# Patient Record
Sex: MALE | Race: WHITE | NOT HISPANIC OR LATINO | Employment: OTHER | URBAN - METROPOLITAN AREA
[De-identification: names, ages, dates, MRNs, and addresses within clinical notes are randomized per-mention and may not be internally consistent; named-entity substitution may affect disease eponyms.]

---

## 2017-11-21 DIAGNOSIS — I25.10 CORONARY ARTERY DISEASE, ANGINA PRESENCE UNSPECIFIED, UNSPECIFIED VESSEL OR LESION TYPE, UNSPECIFIED WHETHER NATIVE OR TRANSPLANTED HEART: Primary | ICD-10-CM

## 2018-01-03 ENCOUNTER — DOCUMENTATION ONLY (OUTPATIENT)
Dept: CARDIOLOGY | Facility: CLINIC | Age: 63
End: 2018-01-03

## 2018-01-03 ENCOUNTER — OFFICE VISIT (OUTPATIENT)
Dept: CARDIOLOGY | Facility: CLINIC | Age: 63
End: 2018-01-03
Payer: MEDICARE

## 2018-01-03 ENCOUNTER — INITIAL CONSULT (OUTPATIENT)
Dept: RADIATION ONCOLOGY | Facility: CLINIC | Age: 63
End: 2018-01-03
Payer: MEDICARE

## 2018-01-03 VITALS
WEIGHT: 192.69 LBS | OXYGEN SATURATION: 100 % | HEIGHT: 69 IN | HEART RATE: 57 BPM | SYSTOLIC BLOOD PRESSURE: 129 MMHG | DIASTOLIC BLOOD PRESSURE: 81 MMHG | BODY MASS INDEX: 28.54 KG/M2

## 2018-01-03 VITALS
DIASTOLIC BLOOD PRESSURE: 81 MMHG | SYSTOLIC BLOOD PRESSURE: 129 MMHG | WEIGHT: 192 LBS | TEMPERATURE: 98 F | BODY MASS INDEX: 28.35 KG/M2 | RESPIRATION RATE: 20 BRPM | HEART RATE: 57 BPM

## 2018-01-03 DIAGNOSIS — I25.10 CORONARY ARTERY DISEASE, ANGINA PRESENCE UNSPECIFIED, UNSPECIFIED VESSEL OR LESION TYPE, UNSPECIFIED WHETHER NATIVE OR TRANSPLANTED HEART: Primary | ICD-10-CM

## 2018-01-03 DIAGNOSIS — I25.10 CAD, MULTIPLE VESSEL: Primary | ICD-10-CM

## 2018-01-03 PROCEDURE — 99999 PR PBB SHADOW E&M-EST. PATIENT-LVL IV: CPT | Mod: PBBFAC,,, | Performed by: INTERNAL MEDICINE

## 2018-01-03 PROCEDURE — 99213 OFFICE O/P EST LOW 20 MIN: CPT | Mod: PBBFAC,27 | Performed by: RADIOLOGY

## 2018-01-03 PROCEDURE — 99999 PR PBB SHADOW E&M-EST. PATIENT-LVL III: CPT | Mod: PBBFAC,,, | Performed by: RADIOLOGY

## 2018-01-03 PROCEDURE — 99204 OFFICE O/P NEW MOD 45 MIN: CPT | Mod: S$PBB,,, | Performed by: INTERNAL MEDICINE

## 2018-01-03 PROCEDURE — 99214 OFFICE O/P EST MOD 30 MIN: CPT | Mod: PBBFAC | Performed by: INTERNAL MEDICINE

## 2018-01-03 PROCEDURE — 99201 PR OFFICE/OUTPT VISIT,NEW,LEVL I: CPT | Mod: S$PBB,,, | Performed by: RADIOLOGY

## 2018-01-03 RX ORDER — DIPHENHYDRAMINE HCL 25 MG
50 CAPSULE ORAL ONCE
Status: CANCELLED | OUTPATIENT
Start: 2018-01-03 | End: 2018-01-03

## 2018-01-03 RX ORDER — CARVEDILOL 12.5 MG/1
3.12 TABLET ORAL 2 TIMES DAILY WITH MEALS
COMMUNITY

## 2018-01-03 RX ORDER — ATORVASTATIN CALCIUM 40 MG/1
40 TABLET, FILM COATED ORAL DAILY
COMMUNITY

## 2018-01-03 RX ORDER — NITROGLYCERIN 0.4 MG/1
0.4 TABLET SUBLINGUAL EVERY 5 MIN PRN
COMMUNITY

## 2018-01-03 RX ORDER — ASPIRIN 81 MG/1
81 TABLET ORAL DAILY
COMMUNITY

## 2018-01-03 RX ORDER — ISOSORBIDE MONONITRATE 60 MG/1
60 TABLET, EXTENDED RELEASE ORAL EVERY MORNING
COMMUNITY

## 2018-01-03 RX ORDER — ASPIRIN 325 MG
325 TABLET ORAL ONCE
Status: CANCELLED | OUTPATIENT
Start: 2018-01-03 | End: 2018-01-03

## 2018-01-03 RX ORDER — SODIUM CHLORIDE 9 MG/ML
3 INJECTION, SOLUTION INTRAVENOUS CONTINUOUS
Status: CANCELLED | OUTPATIENT
Start: 2018-01-03 | End: 2018-01-03

## 2018-01-03 RX ORDER — DICLOFENAC SODIUM 75 MG/1
75 TABLET, DELAYED RELEASE ORAL 2 TIMES DAILY
Status: ON HOLD | COMMUNITY
End: 2018-01-04 | Stop reason: HOSPADM

## 2018-01-03 RX ORDER — HYDROCODONE BITARTRATE AND ACETAMINOPHEN 10; 325 MG/1; MG/1
TABLET ORAL
COMMUNITY

## 2018-01-03 RX ORDER — ALPRAZOLAM 1 MG/1
TABLET, EXTENDED RELEASE ORAL DAILY
COMMUNITY

## 2018-01-03 RX ORDER — RANOLAZINE 500 MG/1
500 TABLET, EXTENDED RELEASE ORAL 2 TIMES DAILY
COMMUNITY

## 2018-01-03 NOTE — LETTER
January 3, 2018      Justin Alberts MD  [address]  [facility] [address]           [facility]  [address]  [facility] [address]  Phone: [phone]          Patient: Asher Dyer   MR Number: [id number]   YOB: 1955   Date of Visit: 1/3/2018       Dear Dr. Justin Alberts:    Thank you for referring Asher Dyer to me for evaluation. Attached you will find relevant portions of my assessment and plan of care.    If you have questions, please do not hesitate to call me. I look forward to following Asher Dyer along with you.    Sincerely,    Porfirio Delgado MD    Enclosure  CC:  No Recipients    If you would like to receive this communication electronically, please contact [email or web address] or [phone] to request more information on [employer] Link access.    For providers and/or their staff who would like to refer a patient to [facility], please contact us through our one-stop-shop provider referral line, [facility], at [phone].    If you feel you have received this communication in error or would no longer like to receive these types of communications, please e-mail [email or web address]

## 2018-01-03 NOTE — LETTER
January 3, 2018      Jose Martin Anne MD  [address]  [place] [address]           [facility]  [address]  [facility] [address]  Phone: [phone]          Patient: Asher Dyer   MR Number: [id number]   YOB: 1955   Date of Visit: 1/3/2018       Dear Dr. Jose Martin Anne:    Thank you for referring Asher Dyer to me for evaluation. Attached you will find relevant portions of my assessment and plan of care.    If you have questions, please do not hesitate to call me. I look forward to following Asher Dyer along with you.    Sincerely,    Justin Alberts MD    Enclosure  CC:  No Recipients    If you would like to receive this communication electronically, please contact [email or web address] or [phone] to request more information on [employer] Link access.    For providers and/or their staff who would like to refer a patient to [facility], please contact us through our one-stop-shop provider referral line, [facility], at [phone].    If you feel you have received this communication in error or would no longer like to receive these types of communications, please e-mail [email or web address]

## 2018-01-03 NOTE — PROGRESS NOTES
"Subjective:    Patient ID:  Asher Dyer is a 62 y.o. male who presents for evaluation of Carotid Artery Disease      HPI  This is 61 y/o M w/ a PMHx sig for complex CAD s/p 4V CABG 7/4/2001 w/ a patent SVG-Diag, patent SVG-RCA, occluded SVG-OMB and patent LIMA-LAD per last [facility] 11/2017.    Multiple PCIs and PTCAs since CABG as follows:    2 stents to SVG-RCA - 12/11/13  1 BMS to SVG-OMB - 8/2009  1 LENO to SVG-RCA - 2/25/16  1 LENO to prox SVG-RCA - 12/8/16  1 LENO 3.75 mm , PTCA 4.0 mm NC balloon to SVG-RCA - 4/22/17  PCI 3.5 x 15 mm angiosculpt balloon to SVG-RCA - 6/18/17  PTCA (balloon angioplasty alone) to SVG-RCA - 8/8/17  PTCA to SVG-RCA - 11/17    Also has ICM 35%, HTN, HLD, Fhx of CAD    Has recurrent anginal sxs 6 weeks to 3 months after each ISR intervention. Referred to us for Brachytherapy for recurrent ISR of SVG-RCA. Denies orthopnea, paroxysmal nocturnal dyspnea or leg edema. Denies palpitations,. Has pre-syncope at times w/ walking. Denies any other significant cardiovascular or cardiopulmonary symptoms.      ROS   Constitution: negative for - fever, chills, weight loss or weight gain  HENT: negative for - sore throat, rhinorrhea, or headache  Eyes: negative for - blurred or double vision  Cardiovascular: see above  Pulmonary: see above  Gastrointestinal: negative for - abdominal pain, nausea, vomiting, or diarrhea  : negative for - dysuria  Neurological: negative for - focal weakness or sensory changes       Objective:    Physical Exam   /81 (BP Location: Right arm, Patient Position: Sitting, BP Method: Large (Automatic))   Pulse (!) 57   Ht 5' 9" (1.753 m)   Wt 87.4 kg (192 lb 10.9 oz)   SpO2 100%   BMI 28.45 kg/m²      Constitutional: No apparent distress, conversant  HEENT: Sclera anicteric, extraocular movements intact  Neck: No jugular venous distension, no carotid bruits  Cardiac: Regular rate and rhythm, no JVD,  no murmurs rubs or gallops, normal S1/S2, no LE edema  Pulm: " Clear to auscultation bilaterally, no wheezes, rales, or ronchi  GI: Abdomen soft, nontender, nondistended  Skin: No ecchymosis, erythema, or ulcers  Psych: Alert and oriented to person place location, appropriate affect  Neuro: No focal deficits  Vascular:   RIGHT LEFT   RADIAL 2+ 2+   ULNAR 2+ 2+   BRACHIAL 2+ 2+   FEMORAL 2+ 2+   DP 2+ 2+   TP 2+ 2+   JAYRO'S TEST Normal Normal   BARBEAU TEST             Assessment:       1. Coronary artery disease, angina presence unspecified, unspecified vessel or lesion type, unspecified whether native or transplanted heart    2. CAD (coronary artery disease)         Plan:       - Planned PCI to SVG-RCA w/ NC 4 mm PTCA followed by 0.9 Laser therapy and Brachytherapy  - Anti-platelet Therapy:  mg load x1, Ticagrelor 90 mg BID  - Access: R CFA 8F  - Catheters: Jr4 Guide  - Serum Creatinine/CrCl: pending  - Allergies: Morphine  - Pre-Hydration: 3 cc/kg/hr IV, continuous, for 1 hour, Pre-Procedure  - Pre-Op Med: Diphenhydramine (benadryl) 50 mg, Oral, Once, Pre-Procedure   - Pt is a LENO candidate and understands the importance of taking Ticagrelor 90 mg BID for at least one year, understands that in case of receiving a drug coated stent the failure to comply with dual anti-platelet therapy is likely to result in stent clotting, heart attack and death.   - The patient understands the risks and benefits and wishes to go ahead with the procedure.  - All patient's questions were answered.  - The risks, benefits & alternatives of the procedure were explained to the patient.   - The risks of coronary angiography include but are not limited to: Bleeding, infection, heart rhythm abnormalities, allergic reactions, kidney injury requiring dilaysis, limb loss, stroke and death.   - Should stenting be indicated, the patient has agreed to dual anti-platelet therapy for 1-consecutive year with a drug-eluting stent and a minimum of 1-month with the use of a bare metal stent.   -  Additionally, pt is aware that non-compliance is likely to result in stent clotting with heart attack, heart failure, and/or death.  - The risks of moderate sedation include hypotension, respiratory depression, arrhythmias, bronchospasm, & death.   - Informed consent was obtained & the patient is agreeable to proceed with the procedure.  - This patient was discussed with the attending interventional cardiologist who agrees with the above assessment & plan.     Above plan d/w Dr. Alberts who is in agreement.    Sorin Avalos M.D.  Interventional Cardiology Fellow  Pager: [phone]

## 2018-01-03 NOTE — PROGRESS NOTES
REFERRING PHYSICIAN: Justin Alberst M.D.    PROBLEM: Mr. Dyer is a 62 years old man with a history of coronary artery disease treated with percutaneous angioplasty and stenting now with restenosis in a previously placed stent in a coronary artery.  He is felt to be a candidate for endovascular irradiation at the time of a planned angioplasty procedure in order to prevent further restenosis.    Expected benefits and risks of endovascular irradiation at the time of coronary artery angioplasty were discussed with patient and his wife.  They appear to understand and signed the consent.    PLAN: The coronary angioplasty procedure is planned for 1/4/18.

## 2018-01-03 NOTE — PROGRESS NOTES
OUTPATIENT CATHETERIZATION INSTRUCTIONS    You have been scheduled for a procedure in the catheterization lab on Thursday, January 4, 2018.     Please report to the Cardiology Waiting Area on the Third floor of the hospital and check in at 7 AM.   You will then be taken to the SSCU (Short Stay Cardiac Unit) and prepared for your procedure. Please be aware that this is not the time of your procedure but the time you are to arrive. The procedures are scheduled on an hourly basis; however, emergency cases take precedence over all other cases.       You may not have anything to eat or drink after midnight the night before your test. You may take your regular morning medications with water. If there are any medications that you should not take you will be instructed to hold them that morning. If you are diabetic and on Metformin (Glucophage) do not take it the day before, the day of, and for 2 days after your procedure.      The procedure will take 1-2 hours to perform. After the procedure, you will return to SSCU on the third floor of the hospital. You will need to lie still (or keep your arm still) for the next 4 to 6 hours to minimize bleeding from the puncture site. Your family may remain in the room with you during this time.       You may be able to be discharged home that same afternoon if there is someone to drive you home and there were no complications. If you have one of the balloon, stent, or device procedures you may spend the night in the hospital. Your doctor will determine, based on your progress, the date and time of your discharge. The results of your procedure will be discussed with you before you are discharged. Any further testing or procedures will be scheduled for you either before you leave or you will be called with these appointments.       If you should have any questions, concerns, or need to change the date of your procedure, please call  VICKIE Edge @ [phone]    Special  Instructions:  Drink plenty of water today and after t he procedure        THE ABOVE INSTRUCTIONS WERE GIVEN TO THE PATIENT VERBALLY AND THEY VERBALIZED UNDERSTANDING.  THEY DO NOT REQUIRE ANY SPECIAL NEEDS AND DO NOT HAVE ANY LEARNING BARRIERS.          Directions for Reporting to Cardiology Waiting Area in the Hospital  If you park in the Parking Garage:  Take elevators to the1st floor of the parking garage.  Continue past the gift shop, coffee shop, and piano.  Take a right and go to the gold elevators. (Elevator B)  Take the elevator to the 3rd floor.  Follow the arrow on the sign on the wall that says Cath Lab Registration/EP Lab Registration.  Follow the long hallway all the way around until you come to a big open area.  This is the registration area.  Check in at Reception Desk.    OR    If family is dropping you off:  Have them drop you off at the front of the Hospital under the green overhang.  Enter through the doors and take a right.  Take the E elevators to the 3rd floor Cardiology Waiting Area.  Check in at the Reception Desk in the waiting room.

## 2018-01-04 ENCOUNTER — HOSPITAL ENCOUNTER (OUTPATIENT)
Facility: HOSPITAL | Age: 63
Discharge: HOME | End: 2018-01-04
Attending: INTERNAL MEDICINE | Admitting: INTERNAL MEDICINE
Payer: MEDICARE

## 2018-01-04 VITALS
SYSTOLIC BLOOD PRESSURE: 139 MMHG | DIASTOLIC BLOOD PRESSURE: 68 MMHG | BODY MASS INDEX: 26.77 KG/M2 | OXYGEN SATURATION: 95 % | HEIGHT: 70 IN | HEART RATE: 54 BPM | TEMPERATURE: 97 F | WEIGHT: 187 LBS | RESPIRATION RATE: 18 BRPM

## 2018-01-04 DIAGNOSIS — Z98.890 STATUS POST CARDIAC CATHETERIZATION: ICD-10-CM

## 2018-01-04 DIAGNOSIS — I25.10 CAD (CORONARY ARTERY DISEASE): Primary | ICD-10-CM

## 2018-01-04 DIAGNOSIS — I25.10 CORONARY ARTERY DISEASE, ANGINA PRESENCE UNSPECIFIED, UNSPECIFIED VESSEL OR LESION TYPE, UNSPECIFIED WHETHER NATIVE OR TRANSPLANTED HEART: ICD-10-CM

## 2018-01-04 LAB
ABO + RH BLD: NORMAL
ANION GAP SERPL CALC-SCNC: 7 MMOL/L
BASOPHILS # BLD AUTO: 0.03 K/UL
BASOPHILS NFR BLD: 0.5 %
BLD GP AB SCN CELLS X3 SERPL QL: NORMAL
BUN SERPL-MCNC: 19 MG/DL
CALCIUM SERPL-MCNC: 8.8 MG/DL
CHLORIDE SERPL-SCNC: 110 MMOL/L
CO2 SERPL-SCNC: 25 MMOL/L
CORONARY STENOSIS: ABNORMAL
CREAT SERPL-MCNC: 1.2 MG/DL
DIFFERENTIAL METHOD: ABNORMAL
EOSINOPHIL # BLD AUTO: 0.3 K/UL
EOSINOPHIL NFR BLD: 4.4 %
ERYTHROCYTE [DISTWIDTH] IN BLOOD BY AUTOMATED COUNT: 13.1 %
EST. GFR  (AFRICAN AMERICAN): >60 ML/MIN/1.73 M^2
EST. GFR  (NON AFRICAN AMERICAN): >60 ML/MIN/1.73 M^2
GLUCOSE SERPL-MCNC: 131 MG/DL
HCT VFR BLD AUTO: 39.6 %
HGB BLD-MCNC: 13.4 G/DL
IMM GRANULOCYTES # BLD AUTO: 0.03 K/UL
IMM GRANULOCYTES NFR BLD AUTO: 0.5 %
LYMPHOCYTES # BLD AUTO: 1.2 K/UL
LYMPHOCYTES NFR BLD: 19.5 %
MCH RBC QN AUTO: 30.2 PG
MCHC RBC AUTO-ENTMCNC: 33.8 G/DL
MCV RBC AUTO: 89 FL
MONOCYTES # BLD AUTO: 0.5 K/UL
MONOCYTES NFR BLD: 9.1 %
NEUTROPHILS # BLD AUTO: 3.9 K/UL
NEUTROPHILS NFR BLD: 66 %
NRBC BLD-RTO: 0 /100 WBC
PLATELET # BLD AUTO: 188 K/UL
PMV BLD AUTO: 10.1 FL
POTASSIUM SERPL-SCNC: 3.6 MMOL/L
RBC # BLD AUTO: 4.43 M/UL
SODIUM SERPL-SCNC: 142 MMOL/L
WBC # BLD AUTO: 5.96 K/UL

## 2018-01-04 PROCEDURE — 93005 ELECTROCARDIOGRAM TRACING: CPT | Mod: 59

## 2018-01-04 PROCEDURE — 86901 BLOOD TYPING SEROLOGIC RH(D): CPT

## 2018-01-04 PROCEDURE — 99152 MOD SED SAME PHYS/QHP 5/>YRS: CPT

## 2018-01-04 PROCEDURE — 93010 ELECTROCARDIOGRAM REPORT: CPT | Mod: ,,, | Performed by: INTERNAL MEDICINE

## 2018-01-04 PROCEDURE — 25000003 PHARM REV CODE 250: Performed by: HOSPITALIST

## 2018-01-04 PROCEDURE — 63600175 PHARM REV CODE 636 W HCPCS

## 2018-01-04 PROCEDURE — 85025 COMPLETE CBC W/AUTO DIFF WBC: CPT

## 2018-01-04 PROCEDURE — 93010 ELECTROCARDIOGRAM REPORT: CPT | Mod: 76,,, | Performed by: INTERNAL MEDICINE

## 2018-01-04 PROCEDURE — C1725 CATH, TRANSLUMIN NON-LASER: HCPCS

## 2018-01-04 PROCEDURE — 25000003 PHARM REV CODE 250: Performed by: INTERNAL MEDICINE

## 2018-01-04 PROCEDURE — 92937 PRQ TRLUML REVSC CAB GRF 1: CPT | Mod: RC,GC,, | Performed by: INTERNAL MEDICINE

## 2018-01-04 PROCEDURE — 92974 CATH PLACE CARDIO BRACHYTX: CPT | Mod: GC,,, | Performed by: INTERNAL MEDICINE

## 2018-01-04 PROCEDURE — 80048 BASIC METABOLIC PNL TOTAL CA: CPT

## 2018-01-04 PROCEDURE — 25000003 PHARM REV CODE 250

## 2018-01-04 PROCEDURE — 99152 MOD SED SAME PHYS/QHP 5/>YRS: CPT | Mod: GC,,, | Performed by: INTERNAL MEDICINE

## 2018-01-04 RX ORDER — SODIUM CHLORIDE 9 MG/ML
3 INJECTION, SOLUTION INTRAVENOUS CONTINUOUS
Status: ACTIVE | OUTPATIENT
Start: 2018-01-04 | End: 2018-01-04

## 2018-01-04 RX ORDER — ASPIRIN 325 MG
325 TABLET ORAL ONCE
Status: COMPLETED | OUTPATIENT
Start: 2018-01-04 | End: 2018-01-04

## 2018-01-04 RX ORDER — ASPIRIN 81 MG/1
81 TABLET ORAL DAILY
Status: DISCONTINUED | OUTPATIENT
Start: 2018-01-04 | End: 2018-01-04 | Stop reason: HOSPADM

## 2018-01-04 RX ORDER — RANOLAZINE 500 MG/1
500 TABLET, EXTENDED RELEASE ORAL 2 TIMES DAILY
Status: DISCONTINUED | OUTPATIENT
Start: 2018-01-04 | End: 2018-01-04 | Stop reason: HOSPADM

## 2018-01-04 RX ORDER — DIPHENHYDRAMINE HCL 50 MG
50 CAPSULE ORAL ONCE
Status: COMPLETED | OUTPATIENT
Start: 2018-01-04 | End: 2018-01-04

## 2018-01-04 RX ORDER — ATORVASTATIN CALCIUM 20 MG/1
40 TABLET, FILM COATED ORAL DAILY
Status: DISCONTINUED | OUTPATIENT
Start: 2018-01-04 | End: 2018-01-04 | Stop reason: HOSPADM

## 2018-01-04 RX ORDER — SODIUM CHLORIDE 9 MG/ML
INJECTION, SOLUTION INTRAVENOUS CONTINUOUS
Status: ACTIVE | OUTPATIENT
Start: 2018-01-04 | End: 2018-01-04

## 2018-01-04 RX ORDER — ISOSORBIDE MONONITRATE 60 MG/1
60 TABLET, EXTENDED RELEASE ORAL EVERY MORNING
Status: DISCONTINUED | OUTPATIENT
Start: 2018-01-04 | End: 2018-01-04 | Stop reason: HOSPADM

## 2018-01-04 RX ORDER — NITROGLYCERIN 0.4 MG/1
0.4 TABLET SUBLINGUAL EVERY 5 MIN PRN
Status: DISCONTINUED | OUTPATIENT
Start: 2018-01-04 | End: 2018-01-04 | Stop reason: HOSPADM

## 2018-01-04 RX ORDER — CARVEDILOL 3.12 MG/1
3.12 TABLET ORAL 2 TIMES DAILY WITH MEALS
Status: DISCONTINUED | OUTPATIENT
Start: 2018-01-04 | End: 2018-01-04 | Stop reason: HOSPADM

## 2018-01-04 RX ADMIN — ASPIRIN 325 MG ORAL TABLET 325 MG: 325 PILL ORAL at 07:01

## 2018-01-04 RX ADMIN — NITROGLYCERIN 0.4 MG: 0.4 TABLET SUBLINGUAL at 03:01

## 2018-01-04 RX ADMIN — DIPHENHYDRAMINE HYDROCHLORIDE 50 MG: 50 CAPSULE ORAL at 07:01

## 2018-01-04 RX ADMIN — SODIUM CHLORIDE 3 ML/KG/HR: 0.9 INJECTION, SOLUTION INTRAVENOUS at 07:01

## 2018-01-04 NOTE — PLAN OF CARE
MD Avalos notified that pt took 81 mg asa this am, said okay to give additional 325mg asa as ordered

## 2018-01-04 NOTE — CONSULTS
"Cardiac Rehab     Asher Dyer   [id number]   1/4/2018       Activity taught: Yes    Cardiac Rehab Phase Taught: Phase 1 & 2    Risk Factors-Modifiable: hyperlipidemia, hypertension    Risk Factors-Non modifiable: age, gender    Teaching Method: Verbal, Living with Heart Disease    Understanding: yes    Response: Patient verbalizes understanding.  He lives in [place], [place], which is an hour from any cardiac rehab programs.  He states that he has been exercising & controlling risk factors.  Wife at bedside.  Questions answered & encouraged for patient to contact rehab team with any questions.    Comments: S/P PTCA. Discussed cardiac rehab and risk factor modification. Team to refer patient to [facility] Cardiac rehab Educational materials were used in the process and given to the patient. They included "Your Guide to Living with Heart Disease", Phase Two Cardiac Rehabilitation information along with a sample Mediterranean diet.The patient expressed understanding of the teaching and expressed desire to take a role in modifying the risk factors when they return home.    Susan Boston RN  Cardiac Rehab Nurse      "

## 2018-01-04 NOTE — NURSING
Patient ambulated in [facility]. R groin remains stable. L shoulder pain has improved since application of hot pack. EKG done NSR, nitro x 1 given at time of CP with no effect on pain. VS stable, tele NSR. Anxious to go home.

## 2018-01-04 NOTE — PLAN OF CARE
Problem: Patient Care Overview  Goal: Plan of Care Review  Outcome: Ongoing (interventions implemented as appropriate)  Received report from VICKIE Villeda. Patient s/p [facility], AAOx3. VSS, no c/o pain or discomfort at this time, resp even and unlabored. Gauze/tegaderm dressing to right groin is with scant amount bloody drainage. No active bleeding. No hematoma noted. Post procedure protocol reviewed with patient and patient's family. Understanding verbalized. Family members at bedside. Nurse call bell within reach. Will continue to monitor per post procedure protocol.

## 2018-01-04 NOTE — NURSING
Dr. Herrera notified of patient c/o chest discomfort. VSS. Also notified right groin site oozing and manual pressure currently being held. EKG ordered. Dr Herrera states he will come to bedside to assess patient.

## 2018-01-04 NOTE — DISCHARGE SUMMARY
[facility]  Discharge Summary      Admit Date: 1/4/2018    Discharge Date and Time:  01/04/2018 4:47 PM    Attending Physician: Justin Alberts MD     Reason for Admission: Brachytherapy/ Laser therapy for ISR    Procedures Performed: Procedure(s) (LRB):  STENT-CORONARY (N/A)    Hospital Course    61 y/o M w/ a PMHx sig for complex CAD s/p 4V CABG 7/4/2001 w/ a patent SVG-Diag, patent SVG-RCA, occluded SVG-OMB and patent LIMA-LAD per last [facility] 11/2017. Patient was admitted and underwent PTCA, laser therapy and brachytherapy for ISR. Procedure tollerated well with no complications.     Final Diagnoses:    Principal Problem: CAD   Secondary Diagnoses:   Active Hospital Problems    Diagnosis  POA    CAD (coronary artery disease) [I25.10]  Yes      Resolved Hospital Problems    Diagnosis Date Resolved POA   No resolved problems to display.       Discharged Condition: stable    Disposition: Home or Self Care    Follow Up/Patient Instructions:     Medications:  Reconciled Home Medications:   Current Discharge Medication List      CONTINUE these medications which have NOT CHANGED    Details   ALPRAZolam (XANAX XR) 1 MG Tb24 Take by mouth once daily.      aspirin (ECOTRIN) 81 MG EC tablet Take 81 mg by mouth once daily.      atorvastatin (LIPITOR) 40 MG tablet Take 40 mg by mouth once daily.      carvedilol (COREG) 12.5 MG tablet Take 3.125 mg by mouth 2 (two) times daily with meals.       hydrocodone-acetaminophen 10-325mg (NORCO)  mg Tab Take by mouth.      isosorbide mononitrate (IMDUR) 60 MG 24 hr tablet Take 60 mg by mouth every morning.      ranolazine (RANEXA) 500 MG Tb12 Take 500 mg by mouth 2 (two) times daily.      ticagrelor (BRILINTA) 90 mg tablet Take 90 mg by mouth 2 (two) times daily.      nitroGLYCERIN (NITROSTAT) 0.4 MG SL tablet Place 0.4 mg under the tongue every 5 (five) minutes as needed for Chest pain.         STOP taking these medications       diclofenac (VOLTAREN) 75 MG  EC tablet Comments:   Reason for Stopping:               Discharge Procedure Orders  Diet Cardiac (2gm sodium and 60gm fat)     Lifting restrictions   Order Comments: 1 week       Follow-up Information     Jose Martin Anne MD In 2 weeks.    Specialty:  Cardiology  Contact information:  [address]  [place] [address] [phone]

## 2018-01-04 NOTE — H&P (VIEW-ONLY)
"Subjective:    Patient ID:  Asher Dyer is a 62 y.o. male who presents for evaluation of Carotid Artery Disease      HPI  This is 63 y/o M w/ a PMHx sig for complex CAD s/p 4V CABG 7/4/2001 w/ a patent SVG-Diag, patent SVG-RCA, occluded SVG-OMB and patent LIMA-LAD per last [facility] 11/2017.    Multiple PCIs and PTCAs since CABG as follows:    2 stents to SVG-RCA - 12/11/13  1 BMS to SVG-OMB - 8/2009  1 LENO to SVG-RCA - 2/25/16  1 LENO to prox SVG-RCA - 12/8/16  1 LENO 3.75 mm , PTCA 4.0 mm NC balloon to SVG-RCA - 4/22/17  PCI 3.5 x 15 mm angiosculpt balloon to SVG-RCA - 6/18/17  PTCA (balloon angioplasty alone) to SVG-RCA - 8/8/17  PTCA to SVG-RCA - 11/17    Also has ICM 35%, HTN, HLD, Fhx of CAD    Has recurrent anginal sxs 6 weeks to 3 months after each ISR intervention. Referred to us for Brachytherapy for recurrent ISR of SVG-RCA. Denies orthopnea, paroxysmal nocturnal dyspnea or leg edema. Denies palpitations,. Has pre-syncope at times w/ walking. Denies any other significant cardiovascular or cardiopulmonary symptoms.      ROS   Constitution: negative for - fever, chills, weight loss or weight gain  HENT: negative for - sore throat, rhinorrhea, or headache  Eyes: negative for - blurred or double vision  Cardiovascular: see above  Pulmonary: see above  Gastrointestinal: negative for - abdominal pain, nausea, vomiting, or diarrhea  : negative for - dysuria  Neurological: negative for - focal weakness or sensory changes       Objective:    Physical Exam   /81 (BP Location: Right arm, Patient Position: Sitting, BP Method: Large (Automatic))   Pulse (!) 57   Ht 5' 9" (1.753 m)   Wt 87.4 kg (192 lb 10.9 oz)   SpO2 100%   BMI 28.45 kg/m²      Constitutional: No apparent distress, conversant  HEENT: Sclera anicteric, extraocular movements intact  Neck: No jugular venous distension, no carotid bruits  Cardiac: Regular rate and rhythm, no JVD,  no murmurs rubs or gallops, normal S1/S2, no LE edema  Pulm: " Clear to auscultation bilaterally, no wheezes, rales, or ronchi  GI: Abdomen soft, nontender, nondistended  Skin: No ecchymosis, erythema, or ulcers  Psych: Alert and oriented to person place location, appropriate affect  Neuro: No focal deficits  Vascular:   RIGHT LEFT   RADIAL 2+ 2+   ULNAR 2+ 2+   BRACHIAL 2+ 2+   FEMORAL 2+ 2+   DP 2+ 2+   TP 2+ 2+   JAYRO'S TEST Normal Normal   BARBEAU TEST             Assessment:       1. Coronary artery disease, angina presence unspecified, unspecified vessel or lesion type, unspecified whether native or transplanted heart    2. CAD (coronary artery disease)         Plan:       - Planned PCI to SVG-RCA w/ NC 4 mm PTCA followed by 0.9 Laser therapy and Brachytherapy  - Anti-platelet Therapy:  mg load x1, Ticagrelor 90 mg BID  - Access: R CFA 8F  - Catheters: Jr4 Guide  - Serum Creatinine/CrCl: pending  - Allergies: Morphine  - Pre-Hydration: 3 cc/kg/hr IV, continuous, for 1 hour, Pre-Procedure  - Pre-Op Med: Diphenhydramine (benadryl) 50 mg, Oral, Once, Pre-Procedure   - Pt is a LENO candidate and understands the importance of taking Ticagrelor 90 mg BID for at least one year, understands that in case of receiving a drug coated stent the failure to comply with dual anti-platelet therapy is likely to result in stent clotting, heart attack and death.   - The patient understands the risks and benefits and wishes to go ahead with the procedure.  - All patient's questions were answered.  - The risks, benefits & alternatives of the procedure were explained to the patient.   - The risks of coronary angiography include but are not limited to: Bleeding, infection, heart rhythm abnormalities, allergic reactions, kidney injury requiring dilaysis, limb loss, stroke and death.   - Should stenting be indicated, the patient has agreed to dual anti-platelet therapy for 1-consecutive year with a drug-eluting stent and a minimum of 1-month with the use of a bare metal stent.   -  Additionally, pt is aware that non-compliance is likely to result in stent clotting with heart attack, heart failure, and/or death.  - The risks of moderate sedation include hypotension, respiratory depression, arrhythmias, bronchospasm, & death.   - Informed consent was obtained & the patient is agreeable to proceed with the procedure.  - This patient was discussed with the attending interventional cardiologist who agrees with the above assessment & plan.     Above plan d/w Dr. Alberts who is in agreement.    Sorin Avalos M.D.  Interventional Cardiology Fellow  Pager: [phone]

## 2018-01-04 NOTE — PROCEDURES
POST CATH NOTE    Procedure:  [facility] with Laser and brachytherapy   Indication: ISR  Access: R CFA    Findings:  ISR of SVG to RCA    Intervention:  PTCA, Brachytherapy and Laser therapy of ISR of SVG to RCA    Patient tolerated the procedure well, no complications    Post Cath Exam:  Vitals:    01/04/18 0736   BP: 110/73   Pulse:    Resp:    Temp:      No unusual pain, hematoma or thrill at vascular access site.  Distal pulse present without signs of ischemia.    Post-procedure orders as per EPIC    Recommendation:  -continue dual antiplatelet therapy daily uninterrupted for at least one year  -high-potency statin, ACE-I and BB therapy  -vascular risk factor control  -cardiac rehab referral    Obie Herrera MD  Cardiology Fellow

## 2018-01-08 LAB
POC ACTIVATED CLOTTING TIME K: 186 SEC (ref 74–137)
SAMPLE: ABNORMAL